# Patient Record
(demographics unavailable — no encounter records)

---

## 2025-04-23 NOTE — DISCUSSION/SUMMARY
[Hyperlipidemia] : hyperlipidemia [Stable] : stable [None] : There are no changes in medication management [Diet Modification] : diet modification [Patient] : the patient [FreeTextEntry1] : Cardiac Murmur - stable; no further interventions at this time (see echo/see last echo) Vitamin D deficiency - Blood work reviewed with patient. Maintain a low caloric diet, low sodium, low cholesterol diet and exercise discussed in detail. Metformin begun for weight loss (self-dc'd Ozempic in past due to SE). Fenofibrate added for hypertriglyceridemia.

## 2025-04-23 NOTE — PHYSICAL EXAM

## 2025-04-23 NOTE — HISTORY OF PRESENT ILLNESS
[FreeTextEntry1] : Denies Chest Pain, SOB, Dizziness, Leg Edema, Orthopnea, PND, Palpitations, Syncope, JAMES, Diaphoresis Cardiac Murmur - stable; no further interventions at this time (see echo)

## 2025-04-23 NOTE — END OF VISIT
[FreeTextEntry3] :  All medical records entered made by the scribe were at my Dr. Jerry Faulkner, discretion and personally dictated by me on Apr 23 2025  4:20PM. I have reviewed the chart and agree that the record accuracy reflects my personal performance of the history, physical exam, assessment and plan. I have also personally directed, reviewed and agreed with the chart. [Time Spent: ___ minutes] : I have spent [unfilled] minutes of time on the encounter which excludes teaching and separately reported services.

## 2025-04-23 NOTE — PHYSICAL EXAM
